# Patient Record
Sex: FEMALE | Race: WHITE
[De-identification: names, ages, dates, MRNs, and addresses within clinical notes are randomized per-mention and may not be internally consistent; named-entity substitution may affect disease eponyms.]

---

## 2023-09-15 ENCOUNTER — HOSPITAL ENCOUNTER (OUTPATIENT)
Dept: HOSPITAL 75 - RAD | Age: 88
End: 2023-09-15
Attending: PHYSICIAN ASSISTANT
Payer: MEDICARE

## 2023-09-15 DIAGNOSIS — R22.43: Primary | ICD-10-CM

## 2023-09-15 PROCEDURE — 93970 EXTREMITY STUDY: CPT

## 2023-09-15 NOTE — DIAGNOSTIC IMAGING REPORT
PROCEDURE: US Venous Lower Ext Dustin.



TECHNIQUE: Multiple real-time grayscale images were obtained over

the lower extremities in various projections, bilaterally.

Additional duplex Doppler and color Doppler images were also

obtained.



INDICATION: Bilateral leg swelling.



There is no evidence of right or left lower extremity DVT. Both

lower extremity deep venous systems demonstrate normal

compressibility with normal response to augmentation and

Valsalva. No fluid collection or mass is detected.



IMPRESSION: No evidence of right or left lower extremity DVT.



Dictated by: 



  Dictated on workstation # [id number]